# Patient Record
Sex: MALE | Race: WHITE
[De-identification: names, ages, dates, MRNs, and addresses within clinical notes are randomized per-mention and may not be internally consistent; named-entity substitution may affect disease eponyms.]

---

## 2022-07-13 ENCOUNTER — HOSPITAL ENCOUNTER (INPATIENT)
Dept: HOSPITAL 75 - ICU | Age: 32
LOS: 2 days | Discharge: HOME | DRG: 917 | End: 2022-07-15
Attending: INTERNAL MEDICINE | Admitting: INTERNAL MEDICINE
Payer: COMMERCIAL

## 2022-07-13 VITALS — DIASTOLIC BLOOD PRESSURE: 73 MMHG | SYSTOLIC BLOOD PRESSURE: 117 MMHG

## 2022-07-13 VITALS — DIASTOLIC BLOOD PRESSURE: 66 MMHG | SYSTOLIC BLOOD PRESSURE: 96 MMHG

## 2022-07-13 VITALS — SYSTOLIC BLOOD PRESSURE: 114 MMHG | DIASTOLIC BLOOD PRESSURE: 84 MMHG

## 2022-07-13 VITALS — SYSTOLIC BLOOD PRESSURE: 95 MMHG | DIASTOLIC BLOOD PRESSURE: 75 MMHG

## 2022-07-13 VITALS — SYSTOLIC BLOOD PRESSURE: 72 MMHG | DIASTOLIC BLOOD PRESSURE: 38 MMHG

## 2022-07-13 VITALS — HEIGHT: 71.65 IN | WEIGHT: 174.83 LBS | BODY MASS INDEX: 23.94 KG/M2

## 2022-07-13 DIAGNOSIS — R57.9: ICD-10-CM

## 2022-07-13 DIAGNOSIS — T43.621A: Primary | ICD-10-CM

## 2022-07-13 DIAGNOSIS — T40.711A: ICD-10-CM

## 2022-07-13 DIAGNOSIS — J96.01: ICD-10-CM

## 2022-07-13 DIAGNOSIS — E87.2: ICD-10-CM

## 2022-07-13 LAB
ALBUMIN SERPL-MCNC: 3.3 GM/DL (ref 3.2–4.5)
ALP SERPL-CCNC: 82 U/L (ref 40–136)
ALT SERPL-CCNC: 16 U/L (ref 0–55)
APTT PPP: YELLOW S
ARTERIAL PATENCY WRIST A: (no result)
BACTERIA #/AREA URNS HPF: NEGATIVE /HPF
BARBITURATES UR QL: NEGATIVE
BASE EXCESS STD BLDA CALC-SCNC: -4.3 MMOL/L (ref -2.5–2.5)
BASOPHILS # BLD AUTO: 0 10^3/UL (ref 0–0.1)
BASOPHILS NFR BLD AUTO: 0 % (ref 0–10)
BASOPHILS NFR BLD MANUAL: 0 %
BDY SITE: (no result)
BENZODIAZ UR QL SCN: NEGATIVE
BILIRUB SERPL-MCNC: 0.4 MG/DL (ref 0.1–1)
BILIRUB UR QL STRIP: NEGATIVE
BODY TEMPERATURE: 36.1
BUN/CREAT SERPL: 11
BUN/CREAT SERPL: 11
CALCIUM SERPL-MCNC: 8.3 MG/DL (ref 8.5–10.1)
CALCIUM SERPL-MCNC: 8.4 MG/DL (ref 8.5–10.1)
CHLORIDE SERPL-SCNC: 109 MMOL/L (ref 98–107)
CHLORIDE SERPL-SCNC: 109 MMOL/L (ref 98–107)
CK SERPL-CCNC: 86 U/L (ref 30–200)
CO2 BLDA CALC-SCNC: 21.6 MMOL/L (ref 21–31)
CO2 SERPL-SCNC: 21 MMOL/L (ref 21–32)
CO2 SERPL-SCNC: 23 MMOL/L (ref 21–32)
COCAINE UR QL: NEGATIVE
CREAT SERPL-MCNC: 0.97 MG/DL (ref 0.6–1.3)
CREAT SERPL-MCNC: 1.26 MG/DL (ref 0.6–1.3)
D DIMER PPP FEU-MCNC: 1.15 UG/ML (ref 0–0.49)
EOSINOPHIL # BLD AUTO: 0.2 10^3/UL (ref 0–0.3)
EOSINOPHIL NFR BLD AUTO: 1 % (ref 0–10)
EOSINOPHIL NFR BLD MANUAL: 0 %
FIBRINOGEN PPP-MCNC: (no result) MG/DL
FIBRINOGEN PPP-MCNC: 241 MG/DL (ref 221–496)
GFR SERPLBLD BASED ON 1.73 SQ M-ARVRAT: 108 ML/MIN
GFR SERPLBLD BASED ON 1.73 SQ M-ARVRAT: 79 ML/MIN
GLUCOSE SERPL-MCNC: 104 MG/DL (ref 70–105)
GLUCOSE SERPL-MCNC: 126 MG/DL (ref 70–105)
GLUCOSE UR STRIP-MCNC: NEGATIVE MG/DL
HCT VFR BLD CALC: 40 % (ref 40–54)
HGB BLD-MCNC: 12.8 G/DL (ref 13.3–17.7)
INHALED O2 FLOW RATE: (no result) L/MIN
INR PPP: 1.2 (ref 0.8–1.4)
KETONES UR QL STRIP: NEGATIVE
LEUKOCYTE ESTERASE UR QL STRIP: NEGATIVE
LYMPHOCYTES # BLD AUTO: 2.2 10^3/UL (ref 1–4)
LYMPHOCYTES NFR BLD AUTO: 13 % (ref 12–44)
MAGNESIUM SERPL-MCNC: 1.6 MG/DL (ref 1.6–2.4)
MCH RBC QN AUTO: 29 PG (ref 25–34)
MCHC RBC AUTO-ENTMCNC: 32 G/DL (ref 32–36)
MCV RBC AUTO: 90 FL (ref 80–99)
METHADONE UR QL SCN: NEGATIVE
MONOCYTES # BLD AUTO: 1 10^3/UL (ref 0–1)
MONOCYTES NFR BLD AUTO: 6 % (ref 0–12)
MONOCYTES NFR BLD: 3 %
NEUTROPHILS # BLD AUTO: 13.9 10^3/UL (ref 1.8–7.8)
NEUTROPHILS NFR BLD AUTO: 79 % (ref 42–75)
NEUTS BAND NFR BLD MANUAL: 84 %
NEUTS BAND NFR BLD: 0 %
NITRITE UR QL STRIP: NEGATIVE
OPIATES UR QL SCN: NEGATIVE
OXYCODONE UR QL: NEGATIVE
PCO2 BLDA: 37 MMHG (ref 35–45)
PH BLDA: 7.36 [PH] (ref 7.37–7.43)
PH UR STRIP: 6 [PH] (ref 5–9)
PHOSPHATE SERPL-MCNC: 3.3 MG/DL (ref 2.3–4.7)
PLATELET # BLD: 314 10^3/UL (ref 130–400)
PMV BLD AUTO: 9.3 FL (ref 9–12.2)
PO2 BLDA: 69 MMHG (ref 79–93)
POTASSIUM SERPL-SCNC: 4 MMOL/L (ref 3.6–5)
POTASSIUM SERPL-SCNC: 4.7 MMOL/L (ref 3.6–5)
PROPOXYPH UR QL: NEGATIVE
PROT SERPL-MCNC: 5.4 GM/DL (ref 6.4–8.2)
PROT UR QL STRIP: NEGATIVE
PROTHROMBIN TIME: 15.1 SEC (ref 12.2–14.7)
RBC #/AREA URNS HPF: (no result) /HPF
RBC MORPH BLD: NORMAL
SAO2 % BLDA FROM PO2: 95 % (ref 94–100)
SODIUM SERPL-SCNC: 138 MMOL/L (ref 135–145)
SODIUM SERPL-SCNC: 140 MMOL/L (ref 135–145)
SP GR UR STRIP: >=1.03 (ref 1.02–1.02)
SQUAMOUS #/AREA URNS HPF: (no result) /HPF
TRICYCLICS UR QL SCN: NEGATIVE
TRIGL SERPL-MCNC: 55 MG/DL (ref ?–150)
URATE CRY #/AREA URNS LPF: (no result) /LPF
VARIANT LYMPHS NFR BLD MANUAL: 13 %
VENTILATION MODE VENT: NO
WBC # BLD AUTO: 17.5 10^3/UL (ref 4.3–11)
WBC #/AREA URNS HPF: (no result) /HPF

## 2022-07-13 PROCEDURE — 80306 DRUG TEST PRSMV INSTRMNT: CPT

## 2022-07-13 PROCEDURE — 85025 COMPLETE CBC W/AUTO DIFF WBC: CPT

## 2022-07-13 PROCEDURE — 94799 UNLISTED PULMONARY SVC/PX: CPT

## 2022-07-13 PROCEDURE — 93306 TTE W/DOPPLER COMPLETE: CPT

## 2022-07-13 PROCEDURE — 82805 BLOOD GASES W/O2 SATURATION: CPT

## 2022-07-13 PROCEDURE — 80307 DRUG TEST PRSMV CHEM ANLYZR: CPT

## 2022-07-13 PROCEDURE — 85379 FIBRIN DEGRADATION QUANT: CPT

## 2022-07-13 PROCEDURE — 82550 ASSAY OF CK (CPK): CPT

## 2022-07-13 PROCEDURE — 5A1945Z RESPIRATORY VENTILATION, 24-96 CONSECUTIVE HOURS: ICD-10-PCS | Performed by: INTERNAL MEDICINE

## 2022-07-13 PROCEDURE — 87040 BLOOD CULTURE FOR BACTERIA: CPT

## 2022-07-13 PROCEDURE — 84484 ASSAY OF TROPONIN QUANT: CPT

## 2022-07-13 PROCEDURE — 81000 URINALYSIS NONAUTO W/SCOPE: CPT

## 2022-07-13 PROCEDURE — 84100 ASSAY OF PHOSPHORUS: CPT

## 2022-07-13 PROCEDURE — 80202 ASSAY OF VANCOMYCIN: CPT

## 2022-07-13 PROCEDURE — 85384 FIBRINOGEN ACTIVITY: CPT

## 2022-07-13 PROCEDURE — 0BH17EZ INSERTION OF ENDOTRACHEAL AIRWAY INTO TRACHEA, VIA NATURAL OR ARTIFICIAL OPENING: ICD-10-PCS | Performed by: INTERNAL MEDICINE

## 2022-07-13 PROCEDURE — 36600 WITHDRAWAL OF ARTERIAL BLOOD: CPT

## 2022-07-13 PROCEDURE — 83605 ASSAY OF LACTIC ACID: CPT

## 2022-07-13 PROCEDURE — 85007 BL SMEAR W/DIFF WBC COUNT: CPT

## 2022-07-13 PROCEDURE — 82947 ASSAY GLUCOSE BLOOD QUANT: CPT

## 2022-07-13 PROCEDURE — 94002 VENT MGMT INPAT INIT DAY: CPT

## 2022-07-13 PROCEDURE — 93005 ELECTROCARDIOGRAM TRACING: CPT

## 2022-07-13 PROCEDURE — 76937 US GUIDE VASCULAR ACCESS: CPT

## 2022-07-13 PROCEDURE — 80053 COMPREHEN METABOLIC PANEL: CPT

## 2022-07-13 PROCEDURE — 36415 COLL VENOUS BLD VENIPUNCTURE: CPT

## 2022-07-13 PROCEDURE — 94003 VENT MGMT INPAT SUBQ DAY: CPT

## 2022-07-13 PROCEDURE — 84478 ASSAY OF TRIGLYCERIDES: CPT

## 2022-07-13 PROCEDURE — 84145 PROCALCITONIN (PCT): CPT

## 2022-07-13 PROCEDURE — 71045 X-RAY EXAM CHEST 1 VIEW: CPT

## 2022-07-13 PROCEDURE — 85610 PROTHROMBIN TIME: CPT

## 2022-07-13 PROCEDURE — 83735 ASSAY OF MAGNESIUM: CPT

## 2022-07-13 PROCEDURE — 80048 BASIC METABOLIC PNL TOTAL CA: CPT

## 2022-07-13 PROCEDURE — 87081 CULTURE SCREEN ONLY: CPT

## 2022-07-13 PROCEDURE — 85027 COMPLETE CBC AUTOMATED: CPT

## 2022-07-13 PROCEDURE — 36569 INSJ PICC 5 YR+ W/O IMAGING: CPT

## 2022-07-13 RX ADMIN — FENTANYL CITRATE PRN MCG: 50 INJECTION, SOLUTION INTRAMUSCULAR; INTRAVENOUS at 11:23

## 2022-07-13 RX ADMIN — DOPAMINE HYDROCHLORIDE IN DEXTROSE SCH MLS/HR: 1.6 INJECTION, SOLUTION INTRAVENOUS at 19:37

## 2022-07-13 RX ADMIN — SODIUM CHLORIDE, SODIUM LACTATE, POTASSIUM CHLORIDE, AND CALCIUM CHLORIDE SCH MLS/HR: 600; 310; 30; 20 INJECTION, SOLUTION INTRAVENOUS at 06:48

## 2022-07-13 RX ADMIN — INSULIN ASPART SCH UNIT: 100 INJECTION, SOLUTION INTRAVENOUS; SUBCUTANEOUS at 17:23

## 2022-07-13 RX ADMIN — DEXMEDETOMIDINE HYDROCHLORIDE SCH MLS/HR: 100 INJECTION, SOLUTION, CONCENTRATE INTRAVENOUS at 20:07

## 2022-07-13 RX ADMIN — VASOPRESSIN SCH MLS/HR: 20 INJECTION INTRAVENOUS at 11:56

## 2022-07-13 RX ADMIN — ENOXAPARIN SODIUM SCH MG: 100 INJECTION SUBCUTANEOUS at 14:56

## 2022-07-13 RX ADMIN — PROPOFOL SCH MLS/HR: 10 INJECTION, EMULSION INTRAVENOUS at 06:48

## 2022-07-13 RX ADMIN — SODIUM CHLORIDE SCH MLS/HR: 900 INJECTION INTRAVENOUS at 19:43

## 2022-07-13 RX ADMIN — Medication SCH MLS/HR: at 11:10

## 2022-07-13 RX ADMIN — INSULIN ASPART SCH UNIT: 100 INJECTION, SOLUTION INTRAVENOUS; SUBCUTANEOUS at 14:56

## 2022-07-13 RX ADMIN — SODIUM CHLORIDE, SODIUM LACTATE, POTASSIUM CHLORIDE, AND CALCIUM CHLORIDE SCH MLS/HR: 600; 310; 30; 20 INJECTION, SOLUTION INTRAVENOUS at 20:58

## 2022-07-13 RX ADMIN — PROPOFOL SCH MLS/HR: 10 INJECTION, EMULSION INTRAVENOUS at 20:08

## 2022-07-13 RX ADMIN — PROPOFOL SCH MLS/HR: 10 INJECTION, EMULSION INTRAVENOUS at 16:40

## 2022-07-13 RX ADMIN — DEXMEDETOMIDINE HYDROCHLORIDE SCH MLS/HR: 100 INJECTION, SOLUTION, CONCENTRATE INTRAVENOUS at 06:48

## 2022-07-13 NOTE — TELE-ICU PROGRESS NOTE
Progress Note


31M transferred from [place] for drug overdose. Recently moved to the area to get

clean. Has overdosed about 25 times in the past year, often on heroin. Today he 

awoke his cousin and told her he was overdosing. The cousin and brother brought 

him to the ER where he collapsed in the doorway. He was ultimately intubated for

airway protection. On arrival to ICU, patient is intubated, on propofol, very 

agitated.





- AMS: secondary to drug overdose. Suspect contaminated samples - tox positive 

for amphetamines and MDMA, only known to use meth (had another similar report of

unknown MDMA ingestion in another patient who believed he was using meth). 

Continue propofol, start precedex and PRN fentanyl. 





- Resp failure: intubated for airway protection and hypercapnic resp failure 

(unclear when he was intubated in relation to ABGs). Hypercapnia was improving 

at [place], repeat ABG pending. Will adjust vent as needed. 





- lactic acidosis: lactic 17 at OSH. Repeat ordered along with sepsis work up. 

More likely related to overdose and or shock state.





Focused Exam


Height, Weight, BMI


Height: '"


Weight: lbs. oz. kg;  BMI


Method:











ANDREEA BRIAN MD                Jul 13, 2022 06:52

## 2022-07-13 NOTE — DIAGNOSTIC IMAGING REPORT
EXAMINATION: Chest, one view.



HISTORY: Intubation.



COMPARISON: None available.



FINDINGS: 



Heart size and pulmonary vasculature are normal. The lungs are

clear without consolidation, pleural effusion, or pneumothorax.

The osseous structures are intact. Endotracheal tube is unchanged

above the iraida. An enteric catheter is present coursing below

the diaphragm.



IMPRESSION: 



1. No acute radiographic abnormality in the chest.



Dictated by: 



  Dictated on workstation # [id number]

## 2022-07-13 NOTE — TELE-ICU CONSULT
History of Present Illness


History of Present Illness


Date Seen by Provider:  Jul 13, 2022


Time Seen by Provider:  08:42


History of Present Illness





  (Tele-ICU Physician , follow up note ) 





New  labs / Images reviewed 





Video assessment done using  teleICU camera 


Discussed with RN. 





CPM  


- cont benzo PRN , precedex , fentanyl prn - try to wake  for SBT assessment


Hypotension probably related to sedatives, cont IVF , follow





Allergies and Home Medications


Allergies


Coded Allergies:  


     No Known Drug Allergies (Unverified , 7/13/22)





Past Medical/Social/Family Hx


Patient Social History


Smoking Status:  Unknown if Ever Smoked





Current Status


Communicates:  Unable To Communicate


Primary Language:  English





Focused Exam


Height, Weight, BMI


Height: '"


Weight: lbs. oz. kg; 23.54 BMI


Method:





Exam


Exam


Patient acknowledged, consented, and participated in this virtual visit which 

was conducted using real time audio/video


Vital Signs








  Date Time  Temp Pulse Resp B/P (MAP) Pulse Ox O2 Delivery O2 Flow Rate FiO2


 


7/13/22 07:00  84      


 


7/13/22 06:54  84 18 83/50 100 Mechanical Ventilator 50.00 


 


7/13/22 06:48  10  126/52    


 


7/13/22 06:48  100  106/52    


 


7/13/22 06:46  82 17 82/55 99 Mechanical Ventilator 50.00 


 


7/13/22 06:45  84 18 80/57 99 Mechanical Ventilator 50.00 


 


7/13/22 06:30  88 18 88/62 98 Mechanical Ventilator 50.00 


 


7/13/22 06:30     99 Mechanical Ventilator  50


 


7/13/22 06:15  91 14 84/60 99 Mechanical Ventilator 50.00 


 


7/13/22 06:00  96 13 106/74 92 Mechanical Ventilator 50.00 


 


7/13/22 06:00 36.0 92 17 106/74 100 Mechanical Ventilator 50.00 


 


7/13/22 05:59  97      








Height & Weight


Height: '"


Weight: lbs. oz. kg; 23.54 BMI


Method:











SHARON GREEN MD         Jul 13, 2022 08:42

## 2022-07-13 NOTE — HISTORY & PHYSICAL-HOSPITALIST
History of Present Illness


HPI/Chief Complaint


Jadon Johnson is a 31 year old male with PMH polysubstance abuse who recently 

moved to the area to try to get clean. He reportedly told his cousin he was 

overdosing and he was taken to the [place] ER. He apparently collapsed at the 

door of the ER. He was intubated for airway protection. He is unable to provide 

any history due to sedation. There is no family at the beside.


Source:  RN/MD


Exam Limitations:  clinical condition


Date Seen


7/13/22


Time Seen by a Provider:  10:50


Attending Physician





PCP


Admitting Physician:


Hank Zavaleta MD 








Attending Physician:


Hank Zavaleta MD


Referring Physician





Date of Admission


Jul 13, 2022 at 05:44





Home Medications & Allergies


Home Medications


Reviewed patient Home Medication Reconciliation performed by pharmacy medication

reconciliations technician and/or nursing.


Patients Allergies have been reviewed.





Allergies





Allergies


Coded Allergies


  No Known Drug Allergies (Unverified7/13/22)








Past Medical-Social-Family Hx


Patient Social History


Smoking Status:  Unknown if Ever Smoked





Current Status


Communicates:  Unable To Communicate


Primary Language:  English





Family Medical History


No Pertinent Family Hx





Review of Systems


Constitutional:  see HPI





Physical Exam


Physical Exam


Vital Signs





Vital Signs - First Documented








 7/13/22 7/13/22





 05:59 06:30


 


Pulse 97 


 


FiO2  50





Capillary Refill :


Height, Weight, BMI


Height: '"


Weight: lbs. oz. kg; 23.54 BMI


Method:


General Appearance:  No Apparent Distress, WD/WN, Other (intubated and sedated)


Neck:  Normal Inspection, Supple


Respiratory:  Lungs Clear, No Respiratory Distress, Other (intubated and 

mechanically ventilated)


Cardiovascular:  Regular Rate, Rhythm, No Edema, No Murmur


Gastrointestinal:  Normal Bowel Sounds, Soft


Extremity:  Normal Inspection, No Pedal Edema


Neurologic/Psychiatric:  Other (sedated)


Skin:  Normal Color, Cool





Results


Results/Procedures


Labs


Laboratory Tests


7/13/22 08:33








7/13/22 13:38








Patient resulted labs reviewed.


Imaging:  Reviewed Imaging Films, Reviewed Imaging Report





Assessment/Plan


Admission Diagnosis


Polysubstance overdose


Admission Status:  Inpatient Order (span 2 midnights)


Reason for Inpatient Admission:  


Mechanical ventilation





Assessment and Plan


Polysubstance overdose


Shock


Lactic acidosis


   UDS positvie for methamphetamine, amphetamine, and cannibis, reportedly on 

other drugs as well


   Plasma drug screen pending


   Possibly septic, leukocytosis and tachycardia


   No infectious source identified at this time


   Procal elevated


   Blood pressures very low


   Begin Levophed, pressors as needed


   Begin Vanc and Zosyn


   IV fluids


   TeleICU consulted


   Social work consulted





DVT prophylaxis: Lovenox





Critical Care


Critically Ill Patient





Diagnosis/Problems


Diagnosis/Problems





(1) Polysubstance overdose


Status:  Acute


Qualifiers:  


   Encounter type:  initial encounter  Injury intent:  undetermined intent  

Qualified Codes:  T50.904A - Poisoning by unspecified drugs, medicaments and 

biological substances, undetermined, initial encounter


(2) Endotracheally intubated


Status:  Acute


(3) Shock


Status:  Acute


(4) Lactic acidosis


Status:  Acute











MANJINDER MISHRA MD              Jul 13, 2022 18:47

## 2022-07-13 NOTE — DIAGNOSTIC IMAGING REPORT
Indication: PICC line placement.



Time of Exam: 12:14 PM



Comparison is made with prior chest earlier same day.



ET tube has tip above the iraida. NG tube passes into the

stomach. There is a right upper extremity PICC line with tip

overlying the SVC right atrial junction. Lungs are clear. No

effusion or pneumothorax is seen.



Impression: Satisfactory PICC line placement.



Dictated by: 



  Dictated on workstation # [id number]

## 2022-07-14 VITALS — DIASTOLIC BLOOD PRESSURE: 77 MMHG | SYSTOLIC BLOOD PRESSURE: 128 MMHG

## 2022-07-14 VITALS — SYSTOLIC BLOOD PRESSURE: 106 MMHG | DIASTOLIC BLOOD PRESSURE: 73 MMHG

## 2022-07-14 VITALS — DIASTOLIC BLOOD PRESSURE: 69 MMHG | SYSTOLIC BLOOD PRESSURE: 109 MMHG

## 2022-07-14 VITALS — SYSTOLIC BLOOD PRESSURE: 121 MMHG | DIASTOLIC BLOOD PRESSURE: 73 MMHG

## 2022-07-14 LAB
ARTERIAL PATENCY WRIST A: (no result)
ARTERIAL PATENCY WRIST A: (no result)
BASE EXCESS STD BLDA CALC-SCNC: -0.8 MMOL/L (ref -2.5–2.5)
BASE EXCESS STD BLDA CALC-SCNC: -1.4 MMOL/L (ref -2.5–2.5)
BASOPHILS # BLD AUTO: 0 10^3/UL (ref 0–0.1)
BASOPHILS NFR BLD AUTO: 0 % (ref 0–10)
BDY SITE: (no result)
BDY SITE: (no result)
BODY TEMPERATURE: 36.5
BODY TEMPERATURE: 36.7
BUN/CREAT SERPL: 8
CALCIUM SERPL-MCNC: 7.9 MG/DL (ref 8.5–10.1)
CHLORIDE SERPL-SCNC: 105 MMOL/L (ref 98–107)
CO2 BLDA CALC-SCNC: 23.8 MMOL/L (ref 21–31)
CO2 BLDA CALC-SCNC: 24.9 MMOL/L (ref 21–31)
CO2 SERPL-SCNC: 20 MMOL/L (ref 21–32)
CREAT SERPL-MCNC: 0.85 MG/DL (ref 0.6–1.3)
EOSINOPHIL # BLD AUTO: 0.9 10^3/UL (ref 0–0.3)
EOSINOPHIL NFR BLD AUTO: 8 % (ref 0–10)
GFR SERPLBLD BASED ON 1.73 SQ M-ARVRAT: 121 ML/MIN
GLUCOSE SERPL-MCNC: 132 MG/DL (ref 70–105)
HCT VFR BLD CALC: 37 % (ref 40–54)
HGB BLD-MCNC: 12.6 G/DL (ref 13.3–17.7)
INHALED O2 FLOW RATE: (no result) L/MIN
INHALED O2 FLOW RATE: (no result) L/MIN
LYMPHOCYTES # BLD AUTO: 1.8 10^3/UL (ref 1–4)
LYMPHOCYTES NFR BLD AUTO: 15 % (ref 12–44)
MAGNESIUM SERPL-MCNC: 1.7 MG/DL (ref 1.6–2.4)
MANUAL DIFFERENTIAL PERFORMED BLD QL: NO
MCH RBC QN AUTO: 30 PG (ref 25–34)
MCHC RBC AUTO-ENTMCNC: 34 G/DL (ref 32–36)
MCV RBC AUTO: 89 FL (ref 80–99)
MONOCYTES # BLD AUTO: 0.7 10^3/UL (ref 0–1)
MONOCYTES NFR BLD AUTO: 6 % (ref 0–12)
NEUTROPHILS # BLD AUTO: 8.7 10^3/UL (ref 1.8–7.8)
NEUTROPHILS NFR BLD AUTO: 71 % (ref 42–75)
PCO2 BLDA: 37 MMHG (ref 35–45)
PCO2 BLDA: 40 MMHG (ref 35–45)
PH BLDA: 7.39 [PH] (ref 7.37–7.43)
PH BLDA: 7.41 [PH] (ref 7.37–7.43)
PHOSPHATE SERPL-MCNC: 3.1 MG/DL (ref 2.3–4.7)
PLATELET # BLD: 281 10^3/UL (ref 130–400)
PMV BLD AUTO: 9.5 FL (ref 9–12.2)
PO2 BLDA: 76 MMHG (ref 79–93)
PO2 BLDA: 88 MMHG (ref 79–93)
POTASSIUM SERPL-SCNC: 4.1 MMOL/L (ref 3.6–5)
SAO2 % BLDA FROM PO2: 96 % (ref 94–100)
SAO2 % BLDA FROM PO2: 98 % (ref 94–100)
SODIUM SERPL-SCNC: 134 MMOL/L (ref 135–145)
VENTILATION MODE VENT: NO
VENTILATION MODE VENT: YES
WBC # BLD AUTO: 12.2 10^3/UL (ref 4.3–11)

## 2022-07-14 RX ADMIN — POTASSIUM CHLORIDE SCH MLS/HR: 200 INJECTION, SOLUTION INTRAVENOUS at 05:55

## 2022-07-14 RX ADMIN — Medication SCH MLS/HR: at 05:09

## 2022-07-14 RX ADMIN — VANCOMYCIN HYDROCHLORIDE SCH MLS/HR: 500 INJECTION, POWDER, LYOPHILIZED, FOR SOLUTION INTRAVENOUS at 02:00

## 2022-07-14 RX ADMIN — POTASSIUM CHLORIDE SCH MEQ: 1500 TABLET, EXTENDED RELEASE ORAL at 05:56

## 2022-07-14 RX ADMIN — VASOPRESSIN SCH MLS/HR: 20 INJECTION INTRAVENOUS at 00:50

## 2022-07-14 RX ADMIN — Medication SCH MLS/HR: at 20:42

## 2022-07-14 RX ADMIN — INSULIN ASPART SCH UNIT: 100 INJECTION, SOLUTION INTRAVENOUS; SUBCUTANEOUS at 00:50

## 2022-07-14 RX ADMIN — PROPOFOL SCH MLS/HR: 10 INJECTION, EMULSION INTRAVENOUS at 01:02

## 2022-07-14 RX ADMIN — FAMOTIDINE SCH MG: 10 INJECTION INTRAVENOUS at 20:56

## 2022-07-14 RX ADMIN — VASOPRESSIN SCH MLS/HR: 20 INJECTION INTRAVENOUS at 09:14

## 2022-07-14 RX ADMIN — SODIUM CHLORIDE SCH MLS/HR: 900 INJECTION INTRAVENOUS at 20:56

## 2022-07-14 RX ADMIN — MAGNESIUM SULFATE IN DEXTROSE SCH MLS/HR: 10 INJECTION, SOLUTION INTRAVENOUS at 06:03

## 2022-07-14 RX ADMIN — DOPAMINE HYDROCHLORIDE IN DEXTROSE SCH MLS/HR: 1.6 INJECTION, SOLUTION INTRAVENOUS at 20:42

## 2022-07-14 RX ADMIN — MAGNESIUM SULFATE IN DEXTROSE SCH MLS/HR: 10 INJECTION, SOLUTION INTRAVENOUS at 05:56

## 2022-07-14 RX ADMIN — METHYLPREDNISOLONE SODIUM SUCCINATE SCH MG: 125 INJECTION, POWDER, FOR SOLUTION INTRAMUSCULAR; INTRAVENOUS at 20:56

## 2022-07-14 RX ADMIN — SODIUM CHLORIDE, SODIUM LACTATE, POTASSIUM CHLORIDE, AND CALCIUM CHLORIDE SCH MLS/HR: 600; 310; 30; 20 INJECTION, SOLUTION INTRAVENOUS at 10:22

## 2022-07-14 RX ADMIN — PROPOFOL SCH MLS/HR: 10 INJECTION, EMULSION INTRAVENOUS at 05:52

## 2022-07-14 RX ADMIN — VASOPRESSIN SCH MLS/HR: 20 INJECTION INTRAVENOUS at 20:42

## 2022-07-14 RX ADMIN — SODIUM CHLORIDE SCH MLS/HR: 900 INJECTION INTRAVENOUS at 12:42

## 2022-07-14 RX ADMIN — INSULIN ASPART SCH UNIT: 100 INJECTION, SOLUTION INTRAVENOUS; SUBCUTANEOUS at 12:00

## 2022-07-14 RX ADMIN — SODIUM CHLORIDE, SODIUM LACTATE, POTASSIUM CHLORIDE, AND CALCIUM CHLORIDE SCH MLS/HR: 600; 310; 30; 20 INJECTION, SOLUTION INTRAVENOUS at 22:53

## 2022-07-14 RX ADMIN — ENOXAPARIN SODIUM SCH MG: 100 INJECTION SUBCUTANEOUS at 10:23

## 2022-07-14 RX ADMIN — SODIUM CHLORIDE SCH MLS/HR: 900 INJECTION INTRAVENOUS at 04:51

## 2022-07-14 RX ADMIN — INSULIN ASPART SCH UNIT: 100 INJECTION, SOLUTION INTRAVENOUS; SUBCUTANEOUS at 05:56

## 2022-07-14 RX ADMIN — FENTANYL CITRATE PRN MCG: 50 INJECTION, SOLUTION INTRAMUSCULAR; INTRAVENOUS at 04:50

## 2022-07-14 RX ADMIN — VANCOMYCIN HYDROCHLORIDE SCH MLS/HR: 500 INJECTION, POWDER, LYOPHILIZED, FOR SOLUTION INTRAVENOUS at 14:20

## 2022-07-14 NOTE — TELE-ICU PROGRESS NOTE
Subjective


Date Seen by a Provider:  Jul 14, 2022


Time Seen by a Provider:  09:36


Subjective/Events-last exam


Available chart/vitals/labs/images reviewed.


Video assessment done using telemetry ICU camera, rest of exam as per RN.


Discussion with the RN, exam as per RN.





Hospital course





This patient admitted with overdose of methamphetamine, cannabis and became 

unresponsive requiring intubation and mechanical ventilation.  Currently he is 

sedated.  Hemodynamically stable and urine output is good he is afebrile.  We 

will plan to wean sedation and try if possible on SBT today.


Review of Systems


ROS PER RN





Sepsis Event


Evaluation


Height, Weight, BMI


Height: '"


Weight: lbs. oz. kg; 23.54 BMI


Method:





Focused Exam


Lactate Level


7/13/22 08:33: Lactic Acid Level 2.24*H


7/13/22 10:33: Lactic Acid Level 2.03*H


7/13/22 13:38: Lactic Acid Level 1.46





Exam


Exam


Patient acknowledged, consented, and participated in this virtual visit which 

was conducted using real time audio/video


Vital Signs








  Date Time  Temp Pulse Resp B/P (MAP) Pulse Ox O2 Delivery O2 Flow Rate FiO2


 


7/14/22 08:00 36.6       


 


7/14/22 08:00     97 Mechanical Ventilator  21


 


7/14/22 06:38  80 18  97   21


 


7/14/22 06:00  84 17 120/81 97 Mechanical Ventilator 21.00 


 


7/14/22 05:52  66  128/77    


 


7/14/22 05:00  64 17 127/77 98 Mechanical Ventilator 21.00 


 


7/14/22 04:00  67 18 129/83 98 Mechanical Ventilator 21.00 


 


7/14/22 04:00      Mechanical Ventilator  21


 


7/14/22 03:00  67 17 128/82 98 Mechanical Ventilator 21.00 


 


7/14/22 02:41  66 18  98   21


 


7/14/22 02:00  67 17 129/79 98 Mechanical Ventilator 21.00 


 


7/14/22 02:00 36.7       


 


7/14/22 01:02  64  124/74    


 


7/14/22 01:00  64      


 


7/14/22 01:00  64 17 125/80 98 Mechanical Ventilator 21.00 


 


7/14/22 00:00  64 18 124/74 97 Mechanical Ventilator 21.00 


 


7/14/22 00:00 36.0       


 


7/13/22 23:59      Mechanical Ventilator  35


 


7/13/22 23:00  66 18 122/73 97 Mechanical Ventilator 21.00 


 


7/13/22 22:10  67 18  100   35


 


7/13/22 22:00  68 17 117/68 100 Mechanical Ventilator 21.00 


 


7/13/22 21:00  72 18 118/71 100 Mechanical Ventilator 35.00 


 


7/13/22 20:08  75  96/66    


 


7/13/22 20:07  75  96/66    


 


7/13/22 20:00 36.3     Mechanical Ventilator 35.00 


 


7/13/22 20:00      Mechanical Ventilator  35


 


7/13/22 20:00  66 18 126/70 99 Mechanical Ventilator 35.00 


 


7/13/22 19:37  75  96/66    


 


7/13/22 19:00  75 17 141/81 100 Mechanical Ventilator 35.00 


 


7/13/22 19:00  75      


 


7/13/22 18:44     100 Mechanical Ventilator 35.00 


 


7/13/22 18:40  75 18  100   50


 


7/13/22 18:00  76 17 92/69 100 Mechanical Ventilator 50.00 


 


7/13/22 17:00  75 17 102/73 100 Mechanical Ventilator 50.00 


 


7/13/22 16:41 36.5       


 


7/13/22 16:41     100 Mechanical Ventilator 50.00 


 


7/13/22 16:40    98/71    


 


7/13/22 16:25      Mechanical Ventilator  50


 


7/13/22 16:00  75 17 98/64 100 Mechanical Ventilator 80.00 


 


7/13/22 15:45  72 18  100   60


 


7/13/22 15:00  80 18 113/82 100 Mechanical Ventilator 80.00 


 


7/13/22 14:00  75 18 113/86 100 Mechanical Ventilator 80.00 


 


7/13/22 13:00  77 18 111/86 100 Mechanical Ventilator 80.00 


 


7/13/22 13:00  44      


 


7/13/22 12:35      Mechanical Ventilator  80


 


7/13/22 12:00  61 17 116/84 100 Mechanical Ventilator 80.00 


 


7/13/22 11:10  75  71/37    


 


7/13/22 11:00  75 18 71/37 96 Mechanical Ventilator 21.00 


 


7/13/22 10:50  80 18  96   21


 


7/13/22 10:00  83 18 62/40 97 Mechanical Ventilator 21.00 














I & O 


 


 7/14/22





 06:59


 


Intake Total 2100 ml


 


Output Total 3220 ml


 


Balance -1120 ml








Height & Weight


Height: '"


Weight: lbs. oz. kg; 23.54 BMI


Method:


General Appearance:  No Apparent Distress, WD/WN, Other (intubated and sedated)


Neck:  Normal Inspection, Supple


Respiratory:  Lungs Clear, No Respiratory Distress, Other (intubated and 

mechanically ventilated)


Cardiovascular:  Regular Rate, Rhythm, No Edema, No Murmur


Extremity:  Normal Inspection, No Pedal Edema


Neurologic/Psychiatric:  Other (sedated)


Skin:  Normal Color, Cool


Other comments


PE PER RN





Results


Lab


Laboratory Tests


7/13/22 08:33








7/13/22 13:38








7/14/22 04:55











Assessment/Plan


Assessment/Plan


1.  Acute hypoxic respiratory failure secondary to drug overdose


2.  Polysubstance abuse.





Recommendations


1.  We will decrease sedation and try SBT today.


2.  Continue hydration with IV fluids


3.  DVT prophylaxis and ulcer prophylaxis


4. Broad spectrum antibiotics per primary care


Critical Care:  Ventilator Management


Time spent with patient (mins):  35











BASIL PEREZ MD                Jul 14, 2022 09:36

## 2022-07-14 NOTE — PROGRESS NOTE - HOSPITALIST
Subjective


HPI/CC On Admission


Date Seen by Provider:  Jul 14, 2022


Time Seen by Provider:  09:25


Jadon Johnson is a 31 year old male with PMH polysubstance abuse who recently 

moved to the area to try to get clean. He reportedly told his cousin he was 

overdosing and he was taken to the [place] ER. He apparently collapsed at the 

door of the ER. He was intubated for airway protection. He is unable to provide 

any history due to sedation. There is no family at the beside.


Subjective/Events-last exam


He remains intubated and sedated.





Focused Exam


Lactate Level


7/13/22 08:33: Lactic Acid Level 2.24*H


7/13/22 10:33: Lactic Acid Level 2.03*H


7/13/22 13:38: Lactic Acid Level 1.46








Objective


Exam


Vital Signs





Vital Signs








  Date Time  Temp Pulse Resp B/P (MAP) Pulse Ox O2 Delivery O2 Flow Rate FiO2


 


7/14/22 12:00      Nasal Cannula 2.00 


 


7/14/22 11:00  86 9 109/63 98   


 


7/14/22 10:33        21


 


7/14/22 08:00 36.6       





Capillary Refill :


General Appearance:  No Apparent Distress, WD/WN, Other (intubated)


Respiratory:  Lungs Clear, No Respiratory Distress, Other (mechanically 

ventilated)


Cardiovascular:  Regular Rate, Rhythm, No Murmur


Gastrointestinal:  Normal Bowel Sounds, Soft


Extremity:  Normal Inspection, No Pedal Edema


Neurologic/Psychiatric:  Other (sedated)


Skin:  Normal Color, Warm/Dry





Results/Procedures


Lab


Laboratory Tests


7/14/22 04:55








Patient resulted labs reviewed.


Imaging:  Reviewed Imaging Report





Assessment/Plan


Assessment and Plan


Assess & Plan/Chief Complaint


Polysubstance overdose


Shock


   UDS positvie for methamphetamine, amphetamine, and cannibis, reportedly on 

other drugs as well


   No infectious source identified at this time


   Procal elevated


   Currently on Dopamine


   Stop Vanc


   Continue Zosyn


   IV fluids


   TeleICU following


   Social work consulted





DVT prophylaxis: Lovenox





Lactic acidosis, resolved





Critical Care


Ventilator Management





Diagnosis/Problems


Diagnosis/Problems





(1) Polysubstance overdose


Status:  Acute


Qualifiers:  


   Encounter type:  initial encounter  Injury intent:  undetermined intent  

Qualified Codes:  T50.904A - Poisoning by unspecified drugs, medicaments and 

biological substances, undetermined, initial encounter


(2) Endotracheally intubated


Status:  Acute


(3) Shock


Status:  Acute


(4) Lactic acidosis


Status:  Resolved


Resolution Date/Time:  7/14/22 @ 16:37











MANJINDER MISHRA MD              Jul 14, 2022 16:37

## 2022-07-15 LAB
BASOPHILS # BLD AUTO: 0 10^3/UL (ref 0–0.1)
BASOPHILS NFR BLD AUTO: 0 % (ref 0–10)
BUN/CREAT SERPL: 7
CALCIUM SERPL-MCNC: 8.6 MG/DL (ref 8.5–10.1)
CHLORIDE SERPL-SCNC: 109 MMOL/L (ref 98–107)
CO2 SERPL-SCNC: 22 MMOL/L (ref 21–32)
CREAT SERPL-MCNC: 1.01 MG/DL (ref 0.6–1.3)
EOSINOPHIL # BLD AUTO: 0.1 10^3/UL (ref 0–0.3)
EOSINOPHIL NFR BLD AUTO: 1 % (ref 0–10)
GFR SERPLBLD BASED ON 1.73 SQ M-ARVRAT: 103 ML/MIN
GLUCOSE SERPL-MCNC: 158 MG/DL (ref 70–105)
HCT VFR BLD CALC: 35 % (ref 40–54)
HGB BLD-MCNC: 11.4 G/DL (ref 13.3–17.7)
LYMPHOCYTES # BLD AUTO: 0.8 10^3/UL (ref 1–4)
LYMPHOCYTES NFR BLD AUTO: 7 % (ref 12–44)
MAGNESIUM SERPL-MCNC: 2 MG/DL (ref 1.6–2.4)
MANUAL DIFFERENTIAL PERFORMED BLD QL: NO
MCH RBC QN AUTO: 29 PG (ref 25–34)
MCHC RBC AUTO-ENTMCNC: 33 G/DL (ref 32–36)
MCV RBC AUTO: 88 FL (ref 80–99)
MONOCYTES # BLD AUTO: 0.1 10^3/UL (ref 0–1)
MONOCYTES NFR BLD AUTO: 1 % (ref 0–12)
NEUTROPHILS # BLD AUTO: 10.5 10^3/UL (ref 1.8–7.8)
NEUTROPHILS NFR BLD AUTO: 91 % (ref 42–75)
PHOSPHATE SERPL-MCNC: 2.9 MG/DL (ref 2.3–4.7)
PLATELET # BLD: 279 10^3/UL (ref 130–400)
PMV BLD AUTO: 9.4 FL (ref 9–12.2)
POTASSIUM SERPL-SCNC: 3.6 MMOL/L (ref 3.6–5)
SODIUM SERPL-SCNC: 143 MMOL/L (ref 135–145)
WBC # BLD AUTO: 11.6 10^3/UL (ref 4.3–11)

## 2022-07-15 RX ADMIN — FAMOTIDINE SCH MG: 10 INJECTION INTRAVENOUS at 07:34

## 2022-07-15 RX ADMIN — ENOXAPARIN SODIUM SCH MG: 100 INJECTION SUBCUTANEOUS at 11:23

## 2022-07-15 RX ADMIN — METHYLPREDNISOLONE SODIUM SUCCINATE SCH MG: 125 INJECTION, POWDER, FOR SOLUTION INTRAMUSCULAR; INTRAVENOUS at 06:00

## 2022-07-15 RX ADMIN — VASOPRESSIN SCH MLS/HR: 20 INJECTION INTRAVENOUS at 06:03

## 2022-07-15 RX ADMIN — POTASSIUM CHLORIDE SCH MEQ: 1500 TABLET, EXTENDED RELEASE ORAL at 06:03

## 2022-07-15 RX ADMIN — MAGNESIUM SULFATE IN DEXTROSE SCH MLS/HR: 10 INJECTION, SOLUTION INTRAVENOUS at 06:03

## 2022-07-15 RX ADMIN — POTASSIUM CHLORIDE SCH MLS/HR: 200 INJECTION, SOLUTION INTRAVENOUS at 06:03

## 2022-07-15 RX ADMIN — SODIUM CHLORIDE SCH MLS/HR: 900 INJECTION INTRAVENOUS at 04:34

## 2022-07-15 RX ADMIN — VANCOMYCIN HYDROCHLORIDE SCH MLS/HR: 500 INJECTION, POWDER, LYOPHILIZED, FOR SOLUTION INTRAVENOUS at 02:17

## 2022-07-15 NOTE — TELE-ICU PROGRESS NOTE
Subjective


Date Seen by a Provider:  Jul 15, 2022


Time Seen by a Provider:  07:50


Subjective/Events-last exam


Available chart/vitals/labs/images reviewed.


Video assessment done using telemetry ICU camera, rest of exam as per RN.


Discussion with the RN, exam as per RN.





Hospital course





Patient was extubated after SBT yesterday and tolerated very well.  However in 

the evening he developed tongue swelling and some itching and there was a 

concern for whether he is developing any angioneurotic edema hence he is started

on a Benadryl IV Solu-Medrol and Pepcid.  Today he states he is feeling somewhat

better but he still has some throat discomfort and it is not clear whether he 

had any tongue swelling today however RN reports he is eating without any 

difficulty and he passes swallow evaluation.  With the video visit he does not 

seems to be in any acute respiratory distress.


Review of Systems


ROS PER RN





Sepsis Event


Evaluation


Height, Weight, BMI


Height: '"


Weight: lbs. oz. kg; 23.54 BMI


Method:





Focused Exam


Lactate Level


7/13/22 08:33: Lactic Acid Level 2.24*H


7/13/22 10:33: Lactic Acid Level 2.03*H


7/13/22 13:38: Lactic Acid Level 1.46





Exam


Exam


Patient acknowledged, consented, and participated in this virtual visit which 

was conducted using real time audio/video


Vital Signs








  Date Time  Temp Pulse Resp B/P (MAP) Pulse Ox O2 Delivery O2 Flow Rate FiO2


 


7/15/22 07:00  86 29 136/79 98 Room Air  


 


7/15/22 07:00  76      


 


7/15/22 06:00  87 23 153/81 100 Room Air  


 


7/15/22 05:00  92 20 115/70 97 Room Air  


 


7/15/22 04:00  91 14 107/60 96 Room Air  


 


7/15/22 04:00      Room Air  


 


7/15/22 03:59 36.8       


 


7/15/22 03:00  85 15 108/62 98 Room Air  


 


7/15/22 02:00  93 18 134/77 98 Room Air  


 


7/15/22 01:00  96      


 


7/15/22 01:00  96 13 107/65 96 Room Air  


 


7/15/22 00:00  93 18 105/70 97 Room Air  


 


7/14/22 23:59      Room Air  


 


7/14/22 23:58 36.6       


 


7/14/22 23:00  101 17 147/89 97 Room Air  


 


7/14/22 22:00  94 15 108/73 100 Room Air  


 


7/14/22 21:00  98 16 139/90 99 Room Air  


 


7/14/22 20:00 37.0       


 


7/14/22 20:00  101 21 99/74 98 Room Air  


 


7/14/22 20:00      Room Air  


 


7/14/22 19:00  80      


 


7/14/22 19:00  80 21 121/74 100 Room Air  


 


7/14/22 18:00  84 11 105/65  Room Air  


 


7/14/22 17:00  96 14 127/84 95 Room Air  


 


7/14/22 16:00  77 14 134/97 97 Room Air  


 


7/14/22 16:00      Room Air  


 


7/14/22 15:00  84 15 124/78 100 Room Air  


 


7/14/22 14:00  75 10 100/91 98 Nasal Cannula 2.00 


 


7/14/22 13:00  89 17 118/69 100 Nasal Cannula 2.00 


 


7/14/22 12:29  76      


 


7/14/22 12:00      Nasal Cannula 2.00 


 


7/14/22 12:00  82 17 102/59 98 Nasal Cannula 2.00 


 


7/14/22 11:32      Nasal Cannula 2.00 


 


7/14/22 11:00  86 9 109/63 98 Mechanical Ventilator 21.00 


 


7/14/22 10:33  89 20  96   21


 


7/14/22 10:00  75 18 115/69 97 Mechanical Ventilator 21.00 


 


7/14/22 09:56  74 18  97   21


 


7/14/22 09:00  77 17 109/69 97 Mechanical Ventilator 21.00 


 


7/14/22 08:00  82 18 108/69 97 Mechanical Ventilator 21.00 


 


7/14/22 08:00 36.6       


 


7/14/22 08:00     97 Mechanical Ventilator  21














I & O 


 


 7/15/22





 07:00


 


Intake Total 1712.5 ml


 


Output Total 2900 ml


 


Balance -1187.5 ml








Height & Weight


Height: '"


Weight: lbs. oz. kg; 23.54 BMI


Method:


General Appearance:  No Apparent Distress, WD/WN, Other (intubated)


Neck:  Normal Inspection, Supple


Respiratory:  Lungs Clear, No Respiratory Distress, Other (mechanically 

ventilated)


Cardiovascular:  Regular Rate, Rhythm, No Murmur


Extremity:  Normal Inspection, No Pedal Edema


Neurologic/Psychiatric:  Other (sedated)


Skin:  Normal Color, Warm/Dry


Other comments


PE PER RN





Results


Lab


Laboratory Tests


7/13/22 08:33








7/13/22 13:38








7/14/22 04:55








7/15/22 01:00











Assessment/Plan


Assessment/Plan


1.  Acute hypoxic respiratory failure secondary to drug overdose improved 


2.  Polysubstance abuse.


3. Tongue swelling with out airway obstruction, ? early aleergic reaction or 

angioneurotic edema.





Recommendations


1.  He was extubated on 7/14/2022 and tolerating well.


2.  Continue Benadryl, steroids and pepcid


3.  DVT prophylaxis and ulcer prophylaxis


4. Broad spectrum antibiotics per primary care.


5. from critical care point of view he may be transferred to med/surg floor.


Critical Care:  Critically Ill Patient


Time spent with patient (mins):  20











BASIL PEREZ MD                Jul 15, 2022 07:50

## 2022-07-15 NOTE — DISCHARGE SUMMARY
Discharge Summary


Hospital Course


Problems/Dx:  


(1) Polysubstance overdose


Status:  Acute


Qualifiers:  


   Qualified Codes:  T50.904A - Poisoning by unspecified drugs, medicaments and 

biological substances, undetermined, initial encounter


(2) Endotracheally intubated


Status:  Acute


(3) Shock


Status:  Acute


(4) Lactic acidosis


Status:  Resolved


Hospital Course


Date of Admission: Jul 13, 2022 at 05:44 


Admission Diagnosis :  Polysubstance overdose requiring endotracheal intubation





Family Physician/Provider:   





Date of Discharge: 7/15/22 


Discharge Diagnosis:  Polysubstance overdose requiring endotracheal intubation, 

shock








Hospital Course:


Jadon Johnson is a 32 year old male who presented with altered mental status 

and was admitted with polysubstance overdose. He was intubated in the [facility] ER 

prior to transfer for airway protection. He was requiring pressor support 

briefly. He was successfully extubated after about a day and half on the 

ventilator. He denies intentional overdose. He denies suicidal ideation. He was 

given information regarding resources for support with cessation of drug use. He

needs to establish with a primary care physician in the area. He was discharged 

home in stable condition.














Labs and Pending Lab Test:


Laboratory Tests


7/15/22 01:00: 


White Blood Count 11.6H, Red Blood Count 3.94L, Hemoglobin 11.4L, Hematocrit 35L

, Mean Corpuscular Volume 88, Mean Corpuscular Hemoglobin 29, Mean Corpuscular 

Hemoglobin Concent 33, Red Cell Distribution Width 14.1, Platelet Count 279, 

Mean Platelet Volume 9.4, Immature Granulocyte % (Auto) 0, Neutrophils (%) 

(Auto) 91H, Lymphocytes (%) (Auto) 7L, Monocytes (%) (Auto) 1, Eosinophils (%) 

(Auto) 1, Basophils (%) (Auto) 0, Neutrophils # (Auto) 10.5H, Lymphocytes # 

(Auto) 0.8L, Monocytes # (Auto) 0.1, Eosinophils # (Auto) 0.1, Basophils # (Aut

o) 0.0, Immature Granulocyte # (Auto) 0.0, Sodium Level 143, Potassium Level 

3.6, Chloride Level 109H, Carbon Dioxide Level 22, Anion Gap 12, Blood Urea 

Nitrogen 7, Creatinine 1.01, Estimat Glomerular Filtration Rate 103, 

BUN/Creatinine Ratio 7, Glucose Level 158H, Calcium Level 8.6, Phosphorus Level 

2.9, Magnesium Level 2.0, Triglycerides Level 52, Vancomycin Level Trough 8.2L





Microbiology


7/13/22 MRSA Screen - Final, Complete


          MRSA not isolated


7/13/22 Blood Culture - Preliminary, Resulted


          No growth





Home Meds


Active


No Active Prescriptions or Reported Medications


Assessment/Pt Instructions


See instructions


Discharge Planning:  <30 minutes discharge planning





Discharge Instructions


Discharge Diet:  No Restrictions


Activity as Tolerated:  Yes


Consultations


TeleICU





Discharge Physical Examination


Vital Signs





Vital Signs








  Date Time  Temp Pulse Resp B/P (MAP) Pulse Ox O2 Delivery O2 Flow Rate FiO2


 


7/15/22 11:25        


 


7/15/22 10:00  80 19  100 Room Air  


 


7/15/22 07:57 36.4       


 


7/14/22 14:00       2.00 


 


7/14/22 10:33        21








General Appearance:  No Apparent Distress, WD/WN


Respiratory:  Lungs Clear, No Respiratory Distress


Cardiovascular:  Regular Rate, Rhythm, No Murmur


Gastrointestinal:  Normal Bowel Sounds, Non Tender, Soft


Extremity:  Normal Inspection, No Pedal Edema


Skin:  Normal Color, Warm/Dry


Neurologic/Psychiatric:  Alert, No Motor/Sensory Deficits, Other (flat affect)


Allergies:  


Coded Allergies:  


     No Known Drug Allergies (Unverified , 7/13/22)





Discharge Summary


Date of Admission


Jul 13, 2022 at 05:44


Date of Discharge


Jul 15, 2022 at 11:25


Discharge Date:  Jul 15, 2022


Discharge Time:  11:25


Admission Diagnosis


Polysubstance overdose


Consults/Procedures


Consulations


TeleICU





Discharge Diagnosis


Polysubstance overdose


Shock





(1) Polysubstance overdose


Status:  Acute


Qualifiers:  


   Qualified Codes:  T50.904A - Poisoning by unspecified drugs, medicaments and 

biological substances, undetermined, initial encounter


(2) Endotracheally intubated


Status:  Acute


(3) Shock


Status:  Acute


(4) Lactic acidosis


Status:  Resolved











MANJINDER MISHRA MD              Jul 15, 2022 13:42